# Patient Record
Sex: MALE | Race: WHITE
[De-identification: names, ages, dates, MRNs, and addresses within clinical notes are randomized per-mention and may not be internally consistent; named-entity substitution may affect disease eponyms.]

---

## 2017-08-09 ENCOUNTER — HOSPITAL ENCOUNTER (OUTPATIENT)
Dept: HOSPITAL 47 - RADXRMAIN | Age: 52
Discharge: HOME | End: 2017-08-09
Payer: COMMERCIAL

## 2017-08-09 DIAGNOSIS — R05: Primary | ICD-10-CM

## 2017-08-09 PROCEDURE — 71020: CPT

## 2017-08-09 NOTE — XR
EXAMINATION TYPE: XR chest 2V

 

DATE OF EXAM: 8/9/2017

 

COMPARISON: NONE

 

TECHNIQUE: PA and lateral views submitted.

 

HISTORY: Cough

 

FINDINGS:

The lungs are clear and  there is no pneumothorax, pleural effusion, or focal pneumonia.  Arthropathy
 shoulders. Slight curvature of the spine changes.

 

IMPRESSION: 

1. No acute process.

## 2017-11-11 ENCOUNTER — HOSPITAL ENCOUNTER (OUTPATIENT)
Dept: HOSPITAL 47 - LABWHC1 | Age: 52
Discharge: HOME | End: 2017-11-11
Attending: INTERNAL MEDICINE
Payer: COMMERCIAL

## 2017-11-11 DIAGNOSIS — Z13.220: Primary | ICD-10-CM

## 2017-11-11 DIAGNOSIS — Z13.9: ICD-10-CM

## 2017-11-11 DIAGNOSIS — Z11.59: ICD-10-CM

## 2017-11-11 DIAGNOSIS — Z12.9: ICD-10-CM

## 2017-11-11 LAB
ALP SERPL-CCNC: 79 U/L (ref 38–126)
ALT SERPL-CCNC: 38 U/L (ref 21–72)
ANION GAP SERPL CALC-SCNC: 6 MMOL/L
AST SERPL-CCNC: 30 U/L (ref 17–59)
BUN SERPL-SCNC: 20 MG/DL (ref 9–20)
CALCIUM SPEC-MCNC: 9.7 MG/DL (ref 8.4–10.2)
CH: 29.9
CHCM: 32.8
CHLORIDE SERPL-SCNC: 103 MMOL/L (ref 98–107)
CHOLEST SERPL-MCNC: 185 MG/DL (ref ?–200)
CO2 SERPL-SCNC: 33 MMOL/L (ref 22–30)
ERYTHROCYTE [DISTWIDTH] IN BLOOD BY AUTOMATED COUNT: 5.07 M/UL (ref 4.3–5.9)
ERYTHROCYTE [DISTWIDTH] IN BLOOD: 13.1 % (ref 11.5–15.5)
GLUCOSE SERPL-MCNC: 105 MG/DL (ref 74–99)
HCT VFR BLD AUTO: 46.6 % (ref 39–53)
HDLC SERPL-MCNC: 39 MG/DL (ref 40–60)
HDW: 2.49
HGB BLD-MCNC: 15.1 GM/DL (ref 13–17.5)
MCH RBC QN AUTO: 29.7 PG (ref 25–35)
MCHC RBC AUTO-ENTMCNC: 32.4 G/DL (ref 31–37)
MCV RBC AUTO: 91.8 FL (ref 80–100)
NON-AFRICAN AMERICAN GFR(MDRD): >60
POTASSIUM SERPL-SCNC: 4.5 MMOL/L (ref 3.5–5.1)
PROT SERPL-MCNC: 6.8 G/DL (ref 6.3–8.2)
PSA SERPL-MCNC: 0.58 NG/ML (ref 0–4)
SODIUM SERPL-SCNC: 142 MMOL/L (ref 137–145)
URATE SERPL-MCNC: 8.2 MG/DL (ref 3.5–8.5)
WBC # BLD AUTO: 6.7 K/UL (ref 3.8–10.6)

## 2017-11-11 PROCEDURE — 84443 ASSAY THYROID STIM HORMONE: CPT

## 2017-11-11 PROCEDURE — 84153 ASSAY OF PSA TOTAL: CPT

## 2017-11-11 PROCEDURE — 36415 COLL VENOUS BLD VENIPUNCTURE: CPT

## 2017-11-11 PROCEDURE — 84550 ASSAY OF BLOOD/URIC ACID: CPT

## 2017-11-11 PROCEDURE — 80061 LIPID PANEL: CPT

## 2017-11-11 PROCEDURE — 85027 COMPLETE CBC AUTOMATED: CPT

## 2017-11-11 PROCEDURE — 80053 COMPREHEN METABOLIC PANEL: CPT

## 2017-11-11 PROCEDURE — 86803 HEPATITIS C AB TEST: CPT

## 2018-03-01 ENCOUNTER — HOSPITAL ENCOUNTER (OUTPATIENT)
Dept: HOSPITAL 47 - RADUSWWP | Age: 53
Discharge: HOME | End: 2018-03-01
Attending: INTERNAL MEDICINE
Payer: COMMERCIAL

## 2018-03-01 DIAGNOSIS — Z86.718: ICD-10-CM

## 2018-03-01 DIAGNOSIS — M79.604: Primary | ICD-10-CM

## 2018-03-01 NOTE — US
EXAMINATION TYPE: US venous doppler duplex LE RT

 

DATE OF EXAM: 3/1/2018 4:44 PM

 

COMPARISON: US

 

CLINICAL HISTORY: Pain in right leg M79.604; lateral and medial calf pain without swelling; prior lef
t leg DVT; on Aspirin

 

SIDE PERFORMED: Right  

 

TECHNIQUE:  The lower extremity deep venous system is examined utilizing real time linear array sonog
niesha with graded compression, doppler sonography and color-flow sonography.

 

VESSELS IMAGED:

Common Femoral Vein

Deep Femoral Vein

Greater Saphenous Vein *

Femoral Vein

Popliteal Vein

Small Saphenous Vein *

Proximal Calf Veins

(* superficial vessels)

 

 

 

Right Leg:  Negative for DVT

 

 

 

 

IMPRESSION: Normal exam. No evidence of deep venous thrombosis.

## 2018-11-03 ENCOUNTER — HOSPITAL ENCOUNTER (OUTPATIENT)
Dept: HOSPITAL 47 - LABWHC1 | Age: 53
Discharge: HOME | End: 2018-11-03
Attending: INTERNAL MEDICINE
Payer: COMMERCIAL

## 2018-11-03 DIAGNOSIS — E78.41: ICD-10-CM

## 2018-11-03 DIAGNOSIS — R53.83: Primary | ICD-10-CM

## 2018-11-03 DIAGNOSIS — N40.0: ICD-10-CM

## 2018-11-03 DIAGNOSIS — E87.8: ICD-10-CM

## 2018-11-03 LAB
ALBUMIN SERPL-MCNC: 4.3 G/DL (ref 3.8–4.9)
ALBUMIN/GLOB SERPL: 1.95 G/DL (ref 1.2–2.1)
ALP SERPL-CCNC: 89 U/L (ref 41–126)
ALT SERPL-CCNC: 23 U/L (ref 10–49)
ANION GAP SERPL CALC-SCNC: 5.3 MMOL/L (ref 4–12)
AST SERPL-CCNC: 28 U/L (ref 14–35)
BUN SERPL-SCNC: 14 MG/DL (ref 9–27)
CALCIUM SPEC-MCNC: 9.4 MG/DL (ref 8.7–10.3)
CHLORIDE SERPL-SCNC: 102 MMOL/L (ref 96–109)
CHOLEST SERPL-MCNC: 170 MG/DL (ref 0–200)
CO2 SERPL-SCNC: 32.7 MMOL/L (ref 21.6–31.8)
ERYTHROCYTE [DISTWIDTH] IN BLOOD BY AUTOMATED COUNT: 5.06 M/UL (ref 4.3–5.9)
ERYTHROCYTE [DISTWIDTH] IN BLOOD: 13.6 % (ref 11.5–15.5)
GLOBULIN SER CALC-MCNC: 2.2 G/DL (ref 2.1–3.7)
GLUCOSE SERPL-MCNC: 111 MG/DL (ref 70–110)
HCT VFR BLD AUTO: 46.3 % (ref 39–53)
HDLC SERPL-MCNC: 41 MG/DL (ref 40–60)
HGB BLD-MCNC: 14.8 GM/DL (ref 13–17.5)
LDLC SERPL CALC-MCNC: 106.8 MG/DL (ref 0–131)
MCH RBC QN AUTO: 29.2 PG (ref 25–35)
MCHC RBC AUTO-ENTMCNC: 31.9 G/DL (ref 31–37)
MCV RBC AUTO: 91.5 FL (ref 80–100)
PLATELET # BLD AUTO: 271 K/UL (ref 150–450)
POTASSIUM SERPL-SCNC: 4.6 MMOL/L (ref 3.5–5.5)
PROT SERPL-MCNC: 6.5 G/DL (ref 6.2–8.2)
PSA SERPL-MCNC: 0.4 NG/ML (ref 0–3.5)
SODIUM SERPL-SCNC: 140 MMOL/L (ref 135–145)
T4 FREE SERPL-MCNC: 1.2 NG/DL (ref 0.8–1.8)
TRIGL SERPL-MCNC: 111 MG/DL (ref 0–149)
VLDLC SERPL CALC-MCNC: 22.2 MG/DL (ref 5–40)
WBC # BLD AUTO: 7.1 K/UL (ref 3.8–10.6)

## 2018-11-03 PROCEDURE — 80061 LIPID PANEL: CPT

## 2018-11-03 PROCEDURE — 85027 COMPLETE CBC AUTOMATED: CPT

## 2018-11-03 PROCEDURE — 80053 COMPREHEN METABOLIC PANEL: CPT

## 2018-11-03 PROCEDURE — 84439 ASSAY OF FREE THYROXINE: CPT

## 2018-11-03 PROCEDURE — 84443 ASSAY THYROID STIM HORMONE: CPT

## 2018-11-03 PROCEDURE — 36415 COLL VENOUS BLD VENIPUNCTURE: CPT

## 2018-11-03 PROCEDURE — 84153 ASSAY OF PSA TOTAL: CPT

## 2018-11-19 ENCOUNTER — HOSPITAL ENCOUNTER (OUTPATIENT)
Dept: HOSPITAL 47 - RADECHMAIN | Age: 53
Discharge: HOME | End: 2018-11-19
Attending: INTERNAL MEDICINE
Payer: COMMERCIAL

## 2018-11-19 DIAGNOSIS — I08.1: Primary | ICD-10-CM

## 2018-11-19 PROCEDURE — 93306 TTE W/DOPPLER COMPLETE: CPT

## 2018-11-20 NOTE — ECHOF
Referral Reason:R06.02 Shortness Of Breath



MEASUREMENTS

--------

HEIGHT: 182.9 cm

WEIGHT: 106.6 kg

BP: 

RVIDd:   2.8 cm     (< 3.3)

IVSd:   1.3 cm     (0.6 - 1.1)

LVIDd:   4.9 cm     (3.9 - 5.3)

LVPWd:   1.2 cm     (0.6 - 1.1)

IVSs:   1.7 cm

LVIDs:   3.3 cm

LVPWs:   1.3 cm

LA Diam:   3.7 cm     (2.7 - 3.8)

Ao Diam:   3.0 cm     (2.0 - 3.7)

AV Cusp:   2.2 cm     (1.5 - 2.6)

LA Diam:   3.6 cm     (2.7 - 3.8)

MV EXCURSION:   15.965 mm     (> 18.000)

MV EF SLOPE:   115 mm/s     (70 - 150)

EPSS:   0.5 cm

MV E Kiel:   0.73 m/s

MV DecT:   236 ms

MV A Kiel:   0.64 m/s

MV E/A Ratio:   1.14 

RAP:   5.00 mmHg

RVSP:   14.99 mmHg







FINDINGS

--------

Sinus rhythm.

This was a technically good study.

LV size, wall thickness and systolic function are normal, with an EF greater than 55%.   The left dyana
tricular size is normal.

The right ventricle is normal in size.

The left atrial size is normal.

The right atrial size is normal.

The aortic valve is trileaflet, and appears structurally normal. No aortic stenosis or regurgitation.


Mild mitral regurgitation is present.

Mild tricuspid regurgitation present.   There is no evidence of pulmonary hypertension.   The right v
entricular systolic pressure, as measured by Doppler, is 14.99mmHg.

There is no pulmonic regurgitation present.

There is no pericardial effusion.



CONCLUSIONS

--------

1. LV size, wall thickness and systolic function are normal, with an EF greater than 55%.

2. The left ventricular size is normal.

3. The right ventricle is normal in size.

4. The left atrial size is normal.

5. The right atrial size is normal.

6. The aortic valve is trileaflet, and appears structurally normal. No aortic stenosis or regurgitati
on.

7. Mild mitral regurgitation is present.

8. Mild tricuspid regurgitation present.

9. There is no evidence of pulmonary hypertension.

10. The right ventricular systolic pressure, as measured by Doppler, is 14.99mmHg.

11. There is no pulmonic regurgitation present.

12. There is no pericardial effusion.





SONOGRAPHER: Janette Reyes RDCS

## 2019-12-30 ENCOUNTER — HOSPITAL ENCOUNTER (OUTPATIENT)
Dept: HOSPITAL 47 - RADUSWWP | Age: 54
Discharge: HOME | End: 2019-12-30
Attending: FAMILY MEDICINE
Payer: COMMERCIAL

## 2019-12-30 DIAGNOSIS — I82.412: Primary | ICD-10-CM

## 2019-12-30 DIAGNOSIS — R22.42: ICD-10-CM

## 2019-12-30 NOTE — US
EXAMINATION TYPE: US venous doppler duplex LE LT

 

DATE OF EXAM: 12/30/2019 11:00 AM

 

COMPARISON: 12/23/2015

 

CLINICAL HISTORY: R22.42 Localized swelling, mass and lump, left low. History of DVT, bruising left l
ower leg

 

SIDE PERFORMED: left  

 

TECHNIQUE:  The lower extremity deep venous system is examined utilizing real time linear array sonog
niesha with graded compression, doppler sonography and color-flow sonography.

 

VESSELS IMAGED:

External Iliac Vein (EIV)

Common Femoral Vein

Deep Femoral Vein

Greater Saphenous Vein *

Femoral Vein

Popliteal Vein

Small Saphenous Vein *

Proximal Calf Veins

(* superficial vessels)

 

 

 

Left Leg:  +positive for DVT left femoral vein upper extending into popliteal vein, chronic appearing
, thready flow with partial compression as on prior exam 

 

 

 

IMPRESSION: Partially occlusive deep venous thrombosis is again seen within the left femoral vein ext
ending into the popliteal vein in the same location as the exam of 2015. Chronic deep venous thrombos
is is suspected rather than acute given the same location and partially maintained flow.

## 2021-06-14 ENCOUNTER — HOSPITAL ENCOUNTER (OUTPATIENT)
Dept: HOSPITAL 47 - RADUSWWP | Age: 56
Discharge: HOME | End: 2021-06-14
Attending: FAMILY MEDICINE
Payer: COMMERCIAL

## 2021-06-14 DIAGNOSIS — R22.42: Primary | ICD-10-CM

## 2021-06-14 DIAGNOSIS — Z86.718: ICD-10-CM

## 2021-06-14 NOTE — US
EXAMINATION TYPE: US venous doppler duplex LE LT

 

DATE OF EXAM: 6/14/2021 3:58 PM

 

COMPARISON: US 12/30/2019

 

CLINICAL HISTORY: M79.662 pain left lower limb,R22.42 Swelling. History of DVT 

 

SIDE PERFORMED: Left  

 

TECHNIQUE:  The lower extremity deep venous system is examined utilizing real time linear array sonog
niesha with graded compression, doppler sonography and color-flow sonography.

 

VESSELS IMAGED:

Common Femoral Vein

Deep Femoral Vein

Greater Saphenous Vein *

Femoral Vein

Popliteal Vein

Small Saphenous Vein *

Proximal Calf Veins

(* superficial vessels)

 

 

 

Left Leg:  Appears positive for chronic appearing DVT- thready flow and partial compression- as seen 
on previous exam

 

 

 

IMPRESSION: 

Incomplete compressibility is noted within the left femoral vein extending into the popliteal vein, a
s seen on prior examination, consistent with nonocclusive deep vein thrombosis.